# Patient Record
Sex: MALE | Race: WHITE | URBAN - METROPOLITAN AREA
[De-identification: names, ages, dates, MRNs, and addresses within clinical notes are randomized per-mention and may not be internally consistent; named-entity substitution may affect disease eponyms.]

---

## 2017-09-28 ENCOUNTER — EMERGENCY (EMERGENCY)
Facility: HOSPITAL | Age: 27
LOS: 1 days | End: 2017-09-28
Payer: MEDICAID

## 2017-09-28 PROCEDURE — 99283 EMERGENCY DEPT VISIT LOW MDM: CPT | Mod: 25

## 2017-09-28 PROCEDURE — 12002 RPR S/N/AX/GEN/TRNK2.6-7.5CM: CPT

## 2018-06-01 ENCOUNTER — OUTPATIENT (OUTPATIENT)
Dept: OUTPATIENT SERVICES | Facility: HOSPITAL | Age: 28
LOS: 1 days | End: 2018-06-01
Payer: MEDICAID

## 2018-06-01 PROCEDURE — G9001: CPT

## 2018-06-08 DIAGNOSIS — R69 ILLNESS, UNSPECIFIED: ICD-10-CM

## 2018-07-01 ENCOUNTER — OUTPATIENT (OUTPATIENT)
Dept: OUTPATIENT SERVICES | Facility: HOSPITAL | Age: 28
LOS: 1 days | End: 2018-07-01

## 2018-07-16 DIAGNOSIS — Z71.89 OTHER SPECIFIED COUNSELING: ICD-10-CM

## 2019-03-20 ENCOUNTER — OUTPATIENT (OUTPATIENT)
Dept: OUTPATIENT SERVICES | Facility: HOSPITAL | Age: 29
LOS: 1 days | End: 2019-03-20
Payer: MEDICAID

## 2019-03-20 DIAGNOSIS — M79.642 PAIN IN LEFT HAND: ICD-10-CM

## 2019-03-20 PROCEDURE — 73130 X-RAY EXAM OF HAND: CPT

## 2019-03-20 PROCEDURE — 73130 X-RAY EXAM OF HAND: CPT | Mod: 26,LT

## 2021-04-18 ENCOUNTER — TRANSCRIPTION ENCOUNTER (OUTPATIENT)
Age: 31
End: 2021-04-18

## 2022-02-08 ENCOUNTER — TRANSCRIPTION ENCOUNTER (OUTPATIENT)
Age: 32
End: 2022-02-08

## 2022-05-10 ENCOUNTER — NON-APPOINTMENT (OUTPATIENT)
Age: 32
End: 2022-05-10

## 2023-01-28 ENCOUNTER — APPOINTMENT (OUTPATIENT)
Dept: ORTHOPEDIC SURGERY | Facility: CLINIC | Age: 33
End: 2023-01-28
Payer: MEDICAID

## 2023-01-28 VITALS — HEIGHT: 70 IN | BODY MASS INDEX: 26.48 KG/M2 | WEIGHT: 185 LBS

## 2023-01-28 DIAGNOSIS — L03.113 CELLULITIS OF RIGHT UPPER LIMB: ICD-10-CM

## 2023-01-28 DIAGNOSIS — S60.221A CONTUSION OF RIGHT HAND, INITIAL ENCOUNTER: ICD-10-CM

## 2023-01-28 DIAGNOSIS — Z86.59 PERSONAL HISTORY OF OTHER MENTAL AND BEHAVIORAL DISORDERS: ICD-10-CM

## 2023-01-28 PROBLEM — Z00.00 ENCOUNTER FOR PREVENTIVE HEALTH EXAMINATION: Status: ACTIVE | Noted: 2023-01-28

## 2023-01-28 PROCEDURE — 99204 OFFICE O/P NEW MOD 45 MIN: CPT | Mod: 25

## 2023-01-28 PROCEDURE — 73130 X-RAY EXAM OF HAND: CPT | Mod: RT

## 2023-01-28 PROCEDURE — 29125 APPL SHORT ARM SPLINT STATIC: CPT

## 2023-01-28 RX ORDER — BUPRENORPHINE HCL/NALOXONE HCL 8 MG-2 MG
TABLET, SUBLINGUAL SUBLINGUAL
Refills: 0 | Status: ACTIVE | COMMUNITY

## 2023-01-28 RX ORDER — AMOXICILLIN AND CLAVULANATE POTASSIUM 500; 125 MG/1; MG/1
500-125 TABLET, FILM COATED ORAL
Qty: 14 | Refills: 0 | Status: COMPLETED | COMMUNITY
Start: 2023-01-28 | End: 2023-02-04

## 2023-01-28 RX ORDER — BUPROPION HYDROCHLORIDE 100 MG/1
TABLET, FILM COATED ORAL
Refills: 0 | Status: ACTIVE | COMMUNITY

## 2023-01-28 NOTE — PHYSICAL EXAM
[Right] : right hand [2nd] : 2nd [3rd] : 3rd [4th] : 4th [5th] : 5th [Metacarpal] : metacarpal [Finger Flexion] : finger flexion [] : no laceration/abrasion [FreeTextEntry3] : dorsal hand

## 2023-01-28 NOTE — DISCUSSION/SUMMARY
[de-identified] : The patient was advised of the diagnosis. The natural history of the pathology was explained in full to the patient in layman's terms. All questions were answered. The risks and benefits of surgical and non-surgical treatment alternatives were explained in full to the patient.\par \par A short arm splint is applied to the RIGHT upper extremity. Proper care instructions given.\par \par I discussed timing and frequency of the medication with the patient, including the importance of finishing antibiotic to completion.\par \par
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
## 2023-02-07 ENCOUNTER — APPOINTMENT (OUTPATIENT)
Dept: ORTHOPEDIC SURGERY | Facility: CLINIC | Age: 33
End: 2023-02-07

## 2023-02-27 ENCOUNTER — RX RENEWAL (OUTPATIENT)
Age: 33
End: 2023-02-27

## 2023-03-21 ENCOUNTER — RX RENEWAL (OUTPATIENT)
Age: 33
End: 2023-03-21

## 2023-03-21 RX ORDER — MELOXICAM 15 MG/1
15 TABLET ORAL DAILY
Qty: 21 | Refills: 0 | Status: ACTIVE | COMMUNITY
Start: 2023-01-28 | End: 1900-01-01

## 2023-08-09 ENCOUNTER — NON-APPOINTMENT (OUTPATIENT)
Age: 33
End: 2023-08-09

## 2024-04-29 ENCOUNTER — INPATIENT (INPATIENT)
Facility: HOSPITAL | Age: 34
LOS: 6 days | Discharge: HOME | DRG: 751 | End: 2024-05-06
Attending: PSYCHIATRY & NEUROLOGY | Admitting: PSYCHIATRY & NEUROLOGY
Payer: MEDICAID

## 2024-04-29 VITALS
DIASTOLIC BLOOD PRESSURE: 114 MMHG | OXYGEN SATURATION: 97 % | WEIGHT: 179.9 LBS | SYSTOLIC BLOOD PRESSURE: 137 MMHG | TEMPERATURE: 100 F | RESPIRATION RATE: 22 BRPM | HEIGHT: 69 IN | HEART RATE: 115 BPM

## 2024-04-29 DIAGNOSIS — F10.10 ALCOHOL ABUSE, UNCOMPLICATED: ICD-10-CM

## 2024-04-29 DIAGNOSIS — F29 UNSPECIFIED PSYCHOSIS NOT DUE TO A SUBSTANCE OR KNOWN PHYSIOLOGICAL CONDITION: ICD-10-CM

## 2024-04-29 DIAGNOSIS — F15.10 OTHER STIMULANT ABUSE, UNCOMPLICATED: ICD-10-CM

## 2024-04-29 DIAGNOSIS — F13.10 SEDATIVE, HYPNOTIC OR ANXIOLYTIC ABUSE, UNCOMPLICATED: ICD-10-CM

## 2024-04-29 LAB
ALBUMIN SERPL ELPH-MCNC: 4.7 G/DL — SIGNIFICANT CHANGE UP (ref 3.3–5)
ALP SERPL-CCNC: 126 U/L — HIGH (ref 40–120)
ALT FLD-CCNC: 41 U/L — SIGNIFICANT CHANGE UP (ref 12–78)
AMPHET UR-MCNC: POSITIVE — SIGNIFICANT CHANGE UP
ANION GAP SERPL CALC-SCNC: 14 MMOL/L — SIGNIFICANT CHANGE UP (ref 5–17)
APAP SERPL-MCNC: < 2 UG/ML (ref 10–30)
APPEARANCE UR: CLEAR — SIGNIFICANT CHANGE UP
AST SERPL-CCNC: 77 U/L — HIGH (ref 15–37)
BACTERIA # UR AUTO: NEGATIVE /HPF — SIGNIFICANT CHANGE UP
BARBITURATES UR SCN-MCNC: NEGATIVE — SIGNIFICANT CHANGE UP
BASOPHILS # BLD AUTO: 0.03 K/UL — SIGNIFICANT CHANGE UP (ref 0–0.2)
BASOPHILS NFR BLD AUTO: 0.3 % — SIGNIFICANT CHANGE UP (ref 0–2)
BENZODIAZ UR-MCNC: POSITIVE — SIGNIFICANT CHANGE UP
BILIRUB SERPL-MCNC: 1 MG/DL — SIGNIFICANT CHANGE UP (ref 0.2–1.2)
BILIRUB UR-MCNC: NEGATIVE — SIGNIFICANT CHANGE UP
BUN SERPL-MCNC: 51 MG/DL — HIGH (ref 7–23)
CALCIUM SERPL-MCNC: 9.6 MG/DL — SIGNIFICANT CHANGE UP (ref 8.5–10.1)
CAST: 11 /LPF — HIGH (ref 0–4)
CHLORIDE SERPL-SCNC: 110 MMOL/L — HIGH (ref 96–108)
CO2 SERPL-SCNC: 15 MMOL/L — LOW (ref 22–31)
COCAINE METAB.OTHER UR-MCNC: NEGATIVE — SIGNIFICANT CHANGE UP
COLOR SPEC: YELLOW — SIGNIFICANT CHANGE UP
CREAT SERPL-MCNC: 1.71 MG/DL — HIGH (ref 0.5–1.3)
DIFF PNL FLD: NEGATIVE — SIGNIFICANT CHANGE UP
EGFR: 53 ML/MIN/1.73M2 — LOW
EOSINOPHIL # BLD AUTO: 0 K/UL — SIGNIFICANT CHANGE UP (ref 0–0.5)
EOSINOPHIL NFR BLD AUTO: 0 % — SIGNIFICANT CHANGE UP (ref 0–6)
ETHANOL SERPL-MCNC: 49 MG/DL — HIGH (ref 0–10)
GLUCOSE SERPL-MCNC: 186 MG/DL — HIGH (ref 70–99)
GLUCOSE UR QL: NEGATIVE MG/DL — SIGNIFICANT CHANGE UP
HCT VFR BLD CALC: 42 % — SIGNIFICANT CHANGE UP (ref 39–50)
HGB BLD-MCNC: 13.5 G/DL — SIGNIFICANT CHANGE UP (ref 13–17)
HYALINE CASTS # UR AUTO: PRESENT
IMM GRANULOCYTES NFR BLD AUTO: 0.3 % — SIGNIFICANT CHANGE UP (ref 0–0.9)
KETONES UR-MCNC: 40 MG/DL
LEUKOCYTE ESTERASE UR-ACNC: NEGATIVE — SIGNIFICANT CHANGE UP
LYMPHOCYTES # BLD AUTO: 0.76 K/UL — LOW (ref 1–3.3)
LYMPHOCYTES # BLD AUTO: 8.8 % — LOW (ref 13–44)
MCHC RBC-ENTMCNC: 27.1 PG — SIGNIFICANT CHANGE UP (ref 27–34)
MCHC RBC-ENTMCNC: 32.1 GM/DL — SIGNIFICANT CHANGE UP (ref 32–36)
MCV RBC AUTO: 84.3 FL — SIGNIFICANT CHANGE UP (ref 80–100)
METHADONE UR-MCNC: NEGATIVE — SIGNIFICANT CHANGE UP
MONOCYTES # BLD AUTO: 0.55 K/UL — SIGNIFICANT CHANGE UP (ref 0–0.9)
MONOCYTES NFR BLD AUTO: 6.4 % — SIGNIFICANT CHANGE UP (ref 2–14)
NEUTROPHILS # BLD AUTO: 7.22 K/UL — SIGNIFICANT CHANGE UP (ref 1.8–7.4)
NEUTROPHILS NFR BLD AUTO: 84.2 % — HIGH (ref 43–77)
NITRITE UR-MCNC: NEGATIVE — SIGNIFICANT CHANGE UP
OPIATES UR-MCNC: NEGATIVE — SIGNIFICANT CHANGE UP
PCP SPEC-MCNC: SIGNIFICANT CHANGE UP
PCP UR-MCNC: NEGATIVE — SIGNIFICANT CHANGE UP
PH UR: 5.5 — SIGNIFICANT CHANGE UP (ref 5–8)
PLATELET # BLD AUTO: 343 K/UL — SIGNIFICANT CHANGE UP (ref 150–400)
POTASSIUM SERPL-MCNC: 4.4 MMOL/L — SIGNIFICANT CHANGE UP (ref 3.5–5.3)
POTASSIUM SERPL-SCNC: 4.4 MMOL/L — SIGNIFICANT CHANGE UP (ref 3.5–5.3)
PROT SERPL-MCNC: 8.3 GM/DL — SIGNIFICANT CHANGE UP (ref 6–8.3)
PROT UR-MCNC: 30 MG/DL
RBC # BLD: 4.98 M/UL — SIGNIFICANT CHANGE UP (ref 4.2–5.8)
RBC # FLD: 13.6 % — SIGNIFICANT CHANGE UP (ref 10.3–14.5)
RBC CASTS # UR COMP ASSIST: 0 /HPF — SIGNIFICANT CHANGE UP (ref 0–4)
SALICYLATES SERPL-MCNC: <1.7 MG/DL — LOW (ref 2.8–20)
SARS-COV-2 RNA SPEC QL NAA+PROBE: SIGNIFICANT CHANGE UP
SODIUM SERPL-SCNC: 139 MMOL/L — SIGNIFICANT CHANGE UP (ref 135–145)
SP GR SPEC: 1.03 — SIGNIFICANT CHANGE UP (ref 1–1.03)
SQUAMOUS # UR AUTO: 0 /HPF — SIGNIFICANT CHANGE UP (ref 0–5)
THC UR QL: NEGATIVE — SIGNIFICANT CHANGE UP
TSH SERPL-MCNC: 2.77 UU/ML — SIGNIFICANT CHANGE UP (ref 0.34–4.82)
UROBILINOGEN FLD QL: 0.2 MG/DL — SIGNIFICANT CHANGE UP (ref 0.2–1)
WBC # BLD: 8.59 K/UL — SIGNIFICANT CHANGE UP (ref 3.8–10.5)
WBC # FLD AUTO: 8.59 K/UL — SIGNIFICANT CHANGE UP (ref 3.8–10.5)
WBC UR QL: 1 /HPF — SIGNIFICANT CHANGE UP (ref 0–5)

## 2024-04-29 PROCEDURE — 86769 SARS-COV-2 COVID-19 ANTIBODY: CPT

## 2024-04-29 PROCEDURE — 83036 HEMOGLOBIN GLYCOSYLATED A1C: CPT

## 2024-04-29 PROCEDURE — 87635 SARS-COV-2 COVID-19 AMP PRB: CPT

## 2024-04-29 PROCEDURE — 87521 HEPATITIS C PROBE&RVRS TRNSC: CPT

## 2024-04-29 PROCEDURE — 80048 BASIC METABOLIC PNL TOTAL CA: CPT

## 2024-04-29 PROCEDURE — 99285 EMERGENCY DEPT VISIT HI MDM: CPT

## 2024-04-29 PROCEDURE — 84443 ASSAY THYROID STIM HORMONE: CPT

## 2024-04-29 PROCEDURE — 99221 1ST HOSP IP/OBS SF/LOW 40: CPT

## 2024-04-29 PROCEDURE — 70450 CT HEAD/BRAIN W/O DYE: CPT | Mod: 26,MC

## 2024-04-29 PROCEDURE — 80061 LIPID PANEL: CPT

## 2024-04-29 PROCEDURE — 86803 HEPATITIS C AB TEST: CPT

## 2024-04-29 PROCEDURE — 36415 COLL VENOUS BLD VENIPUNCTURE: CPT

## 2024-04-29 PROCEDURE — 93005 ELECTROCARDIOGRAM TRACING: CPT

## 2024-04-29 RX ORDER — SERTRALINE 25 MG/1
100 TABLET, FILM COATED ORAL DAILY
Refills: 0 | Status: DISCONTINUED | OUTPATIENT
Start: 2024-04-29 | End: 2024-04-30

## 2024-04-29 RX ORDER — HALOPERIDOL DECANOATE 100 MG/ML
5 INJECTION INTRAMUSCULAR ONCE
Refills: 0 | Status: COMPLETED | OUTPATIENT
Start: 2024-04-29 | End: 2024-04-29

## 2024-04-29 RX ORDER — DIPHENHYDRAMINE HCL 50 MG
50 CAPSULE ORAL ONCE
Refills: 0 | Status: COMPLETED | OUTPATIENT
Start: 2024-04-29 | End: 2024-04-29

## 2024-04-29 RX ORDER — DIPHENHYDRAMINE HCL 50 MG
50 CAPSULE ORAL EVERY 6 HOURS
Refills: 0 | Status: DISCONTINUED | OUTPATIENT
Start: 2024-04-29 | End: 2024-05-06

## 2024-04-29 RX ORDER — TRAZODONE HCL 50 MG
100 TABLET ORAL AT BEDTIME
Refills: 0 | Status: DISCONTINUED | OUTPATIENT
Start: 2024-04-29 | End: 2024-05-06

## 2024-04-29 RX ORDER — ARIPIPRAZOLE 15 MG/1
5 TABLET ORAL DAILY
Refills: 0 | Status: DISCONTINUED | OUTPATIENT
Start: 2024-04-29 | End: 2024-04-30

## 2024-04-29 RX ORDER — BUPRENORPHINE AND NALOXONE 2; .5 MG/1; MG/1
1 TABLET SUBLINGUAL DAILY
Refills: 0 | Status: DISCONTINUED | OUTPATIENT
Start: 2024-04-29 | End: 2024-05-06

## 2024-04-29 RX ORDER — HALOPERIDOL DECANOATE 100 MG/ML
5 INJECTION INTRAMUSCULAR EVERY 6 HOURS
Refills: 0 | Status: DISCONTINUED | OUTPATIENT
Start: 2024-04-29 | End: 2024-05-06

## 2024-04-29 RX ADMIN — ARIPIPRAZOLE 5 MILLIGRAM(S): 15 TABLET ORAL at 14:02

## 2024-04-29 RX ADMIN — HALOPERIDOL DECANOATE 5 MILLIGRAM(S): 100 INJECTION INTRAMUSCULAR at 06:59

## 2024-04-29 RX ADMIN — SERTRALINE 100 MILLIGRAM(S): 25 TABLET, FILM COATED ORAL at 14:02

## 2024-04-29 RX ADMIN — Medication 50 MILLIGRAM(S): at 06:59

## 2024-04-29 RX ADMIN — Medication 2 MILLIGRAM(S): at 06:59

## 2024-04-29 RX ADMIN — BUPRENORPHINE AND NALOXONE 1 FILM(S): 2; .5 TABLET SUBLINGUAL at 14:02

## 2024-04-29 NOTE — ED BEHAVIORAL HEALTH ASSESSMENT NOTE - DESCRIPTION
unemployed, resides with mother in [facility] denies Patient is sleepy and cooperative.

## 2024-04-29 NOTE — ED BEHAVIORAL HEALTH NOTE - BEHAVIORAL HEALTH NOTE
Search Terms: watson Platt, 1990Search Date: 04/29/2024 12:53:35 PM  The Drug Utilization Report below displays all of the controlled substance prescriptions, if any, that your patient has filled in the last twelve months. The information displayed on this report is compiled from pharmacy submissions to the Department, and accurately reflects the information as submitted by the pharmacies.    This report was requested by: Maribell Menard | Reference #: [id number]    Practitioner Count: 2  Pharmacy Count: 1  Current Opioid Prescriptions: 1  Current Benzodiazepine Prescriptions: 0  Current Stimulant Prescriptions: 0      Patient Demographic Information (PDI)       PDI	First Name	Last Name	Birth Date	Gender	Street Address	[facility] Code  A	Watson Platt	1990	Male	[address]	[place]	[address]    Prescription Information      PDI Filter:    PDI	My Rx	Current Rx	Drug Type	Rx Written	Rx Dispensed	Drug	Quantity	Days Supply	Prescriber Name	Prescriber ALEXIA #	Payment Method	Dispenser  A	N	Y	O	04/26/2024	04/26/2024	buprenorphine-naloxone 8-2 mg sl film	32	14	Rivas Rich	[id number]	Medicaid	[id number]  A	N	N	O	04/12/2024	04/12/2024	buprenorphine-naloxone 8-2 mg sl film	32	14	Shoshana Jonelle	[id number]	Medicaid	[id number]  A	N	N	O	03/04/2024	03/10/2024	buprenorphine-naloxone 8-2 mg sl film	7	3	Rivas Rich	[id number]	Medicaid	[id number]  A	N	N	O	01/30/2024	01/31/2024	buprenorphine-naloxone 8-2 mg sl film	68	30	Rivas Rich	[id number]	Medicaid	[id number]  A	N	N	O	01/03/2024	01/04/2024	buprenorphine-naloxone 8-2 mg sl film	68	30	Esme Godinez	[id number]	Medicaid	[id number]  A	N	N	O	12/18/2023	12/20/2023	buprenorphine-naloxone 8-2 mg sl film	34	15	Esme Godniez	[id number]	Medicaid	[facility][id number]  A	N	N	O	11/20/2023	11/20/2023	buprenorphine-naloxone 8-2 mg sl film	60	30	Esme Godinez	[id number]	Medicaid	[id number]  A	N	N	O	11/06/2023	11/08/2023	buprenorphine-naloxone 8-2 mg sl film	28	14	Shaq Richothy	[id number]	Medicaid	[id number]  A	N	N	O	10/24/2023	10/24/2023	buprenorphine-naloxone 8-2 mg sl film	28	14	Lev Carrion MD	[id number]	Medicaid	[id number]  A	N	N	O	09/25/2023	09/25/2023	buprenorphine-naloxone 8-2 mg sl film	28	14	Jonelle Anna	[id number]	Medicaid	[facility][id number]  A	N	N	O	09/18/2023	09/18/2023	buprenorphine-naloxone 8-2 mg sl film	14	7	Jonelle Anna	[id number]	Medicaid	[facility][id number]  A	N	N	O	09/11/2023	09/11/2023	buprenorphine-naloxone 8-2 mg sl film	16	8	Jonelle Anna	[id number]	Medicaid	[id number]  A	N	N	O	08/28/2023	08/29/2023	buprenorphine-naloxone 8-2 mg sl film	2	1	Kwabena Daugherty	[id number]	Medicaid	[facility]  A	N	N	O	08/08/2023	08/09/2023	buprenorphine-naloxone 8-2 mg sl film	28	14	Jonelle Anna	[id number]	Medicaid	[id number]  A	N	N	O	07/25/2023	07/26/2023	buprenorphine-naloxone 8-2 mg sl film	28	14	Jonelle Anna	[id number]	Medicaid	[id number]  A	N	N	O	07/11/2023	07/13/2023	buprenorphine-naloxone 8-2 mg sl film	14	7	Rivas Rich	[id number]	Medicaid	[facility][id number]  A	N	N	O	07/06/2023	07/06/2023	buprenorphine-naloxone 8-2 mg sl film	12	6	Adri Bauer	[id number]	Medicaid	[id number]  A	N	N	O	04/27/2023	05/01/2023	buprenorphine-naloxone 8-2 mg sl film	60	30	Jonelle Anna	[id number]	Medicaid	[id number]    * - Details of Drug Type : O = Opioid, B = Benzodiazepine, S = Stimulant    * - Drugs marked with an asterisk are compound drugs. If the compound drug is made up of more than one controlled substance, then each controlled substance will be a separate row in the t

## 2024-04-29 NOTE — PHARMACOTHERAPY INTERVENTION NOTE - COMMENTS
Med history complete, reviewed medications and allergies with patient and confirmed medication list with doctor first med profile, all medication related questions answered   Med history complete, patient unable to provide medication history, reviewed medications with doctor first med profile and [employer] database, all medication related questions answered

## 2024-04-29 NOTE — BH PATIENT PROFILE - SELF-CONCEPT, DO YOU LIKE YOURSELF, PROFILE
[General Appearance - Well Developed] : well developed [Normal Appearance] : normal appearance [Well Groomed] : well groomed [General Appearance - Well Nourished] : well nourished [No Deformities] : no deformities [General Appearance - In No Acute Distress] : no acute distress [Normal Conjunctiva] : the conjunctiva exhibited no abnormalities [Eyelids - No Xanthelasma] : the eyelids demonstrated no xanthelasmas [Normal Oral Mucosa] : normal oral mucosa [No Oral Pallor] : no oral pallor [No Oral Cyanosis] : no oral cyanosis [] : no respiratory distress [Auscultation Breath Sounds / Voice Sounds] : lungs were clear to auscultation bilaterally [Heart Sounds] : normal S1 and S2 [Arterial Pulses Normal] : the arterial pulses were normal [Edema] : no peripheral edema present [Irregularly Irregular] : the rhythm was irregularly irregular [Systolic grade ___/6] : A grade [unfilled]/6 systolic murmur was heard. [Nail Clubbing] : no clubbing of the fingernails [Cyanosis, Localized] : no localized cyanosis [Oriented To Time, Place, And Person] : oriented to person, place, and time [Bowel Sounds] : normal bowel sounds [Abdomen Soft] : soft [FreeTextEntry1] : No JVD no

## 2024-04-29 NOTE — ED ADULT NURSE NOTE - CHIEF COMPLAINT QUOTE
Pt bibems and [facility] #[id number]. As per EMS & PD " pt was wandering in and out of the streets, taking his clothes off and multiple ppl calling about the pt". Upon arrival, pt refusing to answer any questions, uncooperative, and scattered thoughts. Unknown drug or alcohol use. Security called to triage. MD Cordero at bedside for evaluation. 5mg of haldol, 2 of ativan and 50mg of benadryl ordered for psychosis. 1:1 initiated.

## 2024-04-29 NOTE — ED ADULT NURSE NOTE - OBJECTIVE STATEMENT
Pt bibems and [employer] #[id number]. Pt was found to be wandering in the middle of the road, 'taking clothes off' and PD 'received many calls' regarding pt. Pt uncooperative, refusing to answer questions, and saying things that do not make sense. Pt flailing in the stretcher- security called to triage. MD Cordero at bedside for evaluation. 5mg of haldol, 2 of ativan and 50mg of benadryl ordered for psychosis. 1:1 initiated. unknown drug or alcohol use.
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
## 2024-04-29 NOTE — BH PATIENT PROFILE - FLU SEASON?
Yes... Quality 130: Documentation Of Current Medications In The Medical Record: Current Medications Documented Detail Level: Detailed

## 2024-04-29 NOTE — BH SOCIAL WORK INITIAL PSYCHOSOCIAL EVALUATION - NSBHLIFEGOAL_PSY_ALL_CORE
Patient will be referred to the appropriate level of outpatient treatment and have appointments within 3-5 days of discharge.  Patient will be discharged to safe housing either back home or DSS emergency housing.  Benefits in place   will explore need for dual diagnosis tx with appointments if needed.      SW to continue to follow to plan for future d/c and assess needs.

## 2024-04-29 NOTE — H&P ADULT - NSICDXFAMHXNEG_GEN_ALL
atrial fibrillation/cancer/COPD/coronary disease/diabetes/heart disease/hypertension/kidney disease/stroke

## 2024-04-29 NOTE — ED PROVIDER NOTE - PHYSICAL EXAMINATION
Constitutional: intoxicated male disorganized.   Eyes: PERRLA  Head: Normocephalic   Mouth: MMM  Cardiac: regular rate   Resp: Lungs CTAB  GI: Abd s/nt/nd, no rebound or guarding.  Neuro: awake, alert, moving all extremities  Skin: No rashes   Psych: No SI/HI.

## 2024-04-29 NOTE — BH SOCIAL WORK INITIAL PSYCHOSOCIAL EVALUATION - NSBHLEGALCONVICT_PSY_ALL_CORE
15 yrs ago Pt incarcerated unknown details 15 yrs ago Pt incarcerated unknown details  Registered Sex Offender Level 3

## 2024-04-29 NOTE — H&P ADULT - NS ATTEND AMEND GEN_ALL_CORE FT
Case discussed and reviewed in detail after initial evaluation above by ARMIDA Arrington. Please note my plan below    33 y/o M w/ PMH of schizoaffetive vs schizophrenia vs substance induced psychotic disorder, polysubstance abuse,  p/w psychosis    *Psychosis  -Management as per psych    *Suspect LESLEY  -Encourage oral hydration  -Nephro consult   -Repeat BMP in AM     *Polysubstance abuse  -Monitor for signs of withdrawal     *Transaminitis / Elevated alk phos  -If stable -> F/u outpatient GI for further work up     *DVT ppx  -Encourage ambulation     50 mins required for consult Case discussed and reviewed in detail after initial evaluation above by ARMIDA Arrington. Please note my plan below    33 y/o M w/ PMH of schizoaffective vs schizophrenia vs substance induced psychotic disorder, polysubstance abuse,  p/w psychosis    *Psychosis  -Management as per psych    *Suspect LESLEY  -Encourage oral hydration  -Nephro consult   -Repeat BMP in AM     *Polysubstance abuse  -Monitor for signs of withdrawal     *Transaminitis / Elevated alk phos  -If stable -> F/u outpatient GI for further work up     *DVT ppx  -Encourage ambulation     55 mins required for consult
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
## 2024-04-29 NOTE — ED ADULT NURSE REASSESSMENT NOTE - NS ED NURSE REASSESS COMMENT FT1
report received from Radha SILVA. Pt calm and sleeping in stretcher, respirations even and unlabored. Pt arousable to voice. Blood work sent.  1:1 at bedside.

## 2024-04-29 NOTE — ED BEHAVIORAL HEALTH ASSESSMENT NOTE - GENERAL APPEARANCE
Continue amlodipine and metoprolol  Patient's chlorthalidone is on hold  Nephrologist started the patient on torsemide 10 mg once a day today  No deformities present

## 2024-04-29 NOTE — BH PATIENT PROFILE - PRIMARY ROLES/RESPONSIBILITIES
A rotating dilator sheath was inserted over the RV lead. The rotating dilator sheath was removed from lead. none

## 2024-04-29 NOTE — ED BEHAVIORAL HEALTH ASSESSMENT NOTE - RISK ASSESSMENT
Low acute risk: Patient does not have thoughts about harming self or anybody else.  He presents psychotic disorganized.    Increased long-term risk considering his underlying psychiatric condition and substance abuse.    Protective factors: Patient is engaged in treatment and he has supportive mother.    Mitigation of risk: Admission to inpatient psychiatry, medication and safety plan.

## 2024-04-29 NOTE — ED PROVIDER NOTE - CLINICAL SUMMARY MEDICAL DECISION MAKING FREE TEXT BOX
33 y/o male presents very agitated, not allowing us to care for him. Attempted verbal redirection. Will medicate, allow pt to sober up, CT brain, reassess.

## 2024-04-29 NOTE — H&P ADULT - SOCIAL HISTORY: SUBSTANCE USE
Denies use, but per history uses crystal meth.   Urine tox positive for amphetamines and benzodiazapine
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
## 2024-04-29 NOTE — H&P ADULT - NSHPPHYSICALEXAM_GEN_ALL_CORE
Vital Signs Last 24 Hrs  T(C): 36.3 (04-29-24 @ 18:05), Max: 37.8 (04-29-24 @ 06:54)  T(F): 97.4 (04-29-24 @ 18:05), Max: 100 (04-29-24 @ 06:54)  HR: 89 (04-29-24 @ 16:36) (89 - 116)  BP: 120/82 (04-29-24 @ 16:36) (120/82 - 143/73)  BP(mean): 96 (04-29-24 @ 16:36) (93 - 96)  RR: 10 (04-29-24 @ 16:36) (10 - 22)  SpO2: 100% (04-29-24 @ 16:36) (97% - 100%)    Orthostatic VS  04-29-24 @ 18:05  Lying BP: --/-- HR: --  Sitting BP: 124/78 HR: 99  Standing BP: 92/68 HR: 102  Site: --  Mode: --

## 2024-04-29 NOTE — ED PROVIDER NOTE - OBJECTIVE STATEMENT
35 y/o male presents [employer] agitated. PD states they received 3 different calls regarding pt. Pt was at [facility] then [facility] department and was acting out. PD reports when they arrived at these locations pt was gone. PD received another call, were able to apprehend him and brought pt to ED for evaluation.  Pt completely disorganized with one shoe on. JOVANY reviewed. Pt with hx of drug use including crystal meth and EtOH use. Pt admits to EtOH use last night.

## 2024-04-29 NOTE — BH SOCIAL WORK INITIAL PSYCHOSOCIAL EVALUATION - NSBHFINANCE_PSY_ALL_CORE
Mother provides financial support, Pt disabled/Social Security Disability (SSD) Mother provides financial support/No source of income

## 2024-04-29 NOTE — BH PATIENT PROFILE - HOME MEDICATIONS
traZODone 100 mg oral tablet , 1 tab(s) orally once a day (at bedtime) as needed for sleep  sertraline 100 mg oral tablet , 1 tab(s) orally once a day  buprenorphine-naloxone 8 mg-2 mg sublingual film , 2.25 film(s) sublingually once a day

## 2024-04-29 NOTE — H&P ADULT - ASSESSMENT
34 year old male with a past medical/psychiatric history of schizoaffective versus schizophrenia versus substance-induced psychotic disorders, polysubstance abuse including alcohol and crystal meth, with long history of psych illness/hospitalizations and is followed in [facility] program who presented to the hospital after being brought in by police for bizarre behavior and agitation. Patient was admitted to psychiatry for safety and stabilization. Medicine was consulted for medical evaluation.     Plan  1. Schizoaffective versus schizophrenia versus substance-induced psychotic disorders,  - management as per psychiatry    2. Polysubstance abuse   - Management per psychiatry     3. Elevated blood glucose level  - BMP in AM   - A1c in AM    4. LESLEY   - Encourage hydration  - Repeat BMP in AM   - Trend BUN/Creat  - If not improving consult Nephrology    5. DVT PPX  - Encourage Ambulation

## 2024-04-29 NOTE — ED ADULT TRIAGE NOTE - CHIEF COMPLAINT QUOTE
Pt bibems and [facility] #[id number]. As per EMS & PD " pt was wandering in and out of the streets, taking his clothes off and multiple ppl calling about the pt". Upon arrival, pt refusing to answer any questions, uncooperative, and scattered thoughts. Unknown drug or alcohol use. Security called to triage. MD Cordero at bedside for evaluation. 5mg of haldol, 2 of ativan and 50mg of benadryl ordered for psychosis. 1:1 initiated.
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
## 2024-04-29 NOTE — BH SOCIAL WORK INITIAL PSYCHOSOCIAL EVALUATION - NSBHSUBSTANCELAST_PSY_ALL_CORE
"Occupational Therapy   Initial Evaluation     Patient Name: Stephen Ortiz  Age:  83 y.o., Sex:  male  Medical Record #: [id number]  Today's Date: 12/29/2020     Precautions  Precautions: Fall Risk  Comments: new o2 needs    Assessment  Patient is 83 y.o. male with a diagnosis of acute respiratory failure as well as R great toe ulcer possibly osteomyelitis.  Additional factors influencing patient status / progress: weakness, fatigue, impaired balance.      Plan    Recommend Occupational Therapy 3 times per week until therapy goals are met for the following treatments:  Adaptive Equipment, Neuro Re-Education / Balance, Self Care/Activities of Daily Living, Therapeutic Activities and Therapeutic Exercises.    DC Equipment Recommendations: Unable to determine at this time  Discharge Recommendations: Recommend post-acute placement for additional occupational therapy services prior to discharge home     Subjective    \"I installed a grab bar myself in the hotel room\"     Objective       12/29/20 0950   Prior Living Situation   Prior Services Home-Independent   Housing / Facility   ([facility])   Bathroom Set up Bathtub / Shower Combination;Grab Bars   Equipment Owned 4-Wheel Walker   Lives with - Patient's Self Care Capacity Adult Children   Comments Pt just moved to the area, staying in a hotel right now until they find a place. Son is currently not working so can help if needed.   Prior Level of ADL Function   Self Feeding Independent   Grooming / Hygiene Independent   Bathing Independent   Dressing Independent   Toileting Independent   Prior Level of IADL Function   Medication Management Requires Assist   Laundry Requires Assist   Kitchen Mobility Requires Assist   Finances Requires Assist   Home Management Requires Assist   Shopping Requires Assist   Prior Level Of Mobility Independent With Device in Community;Independent With Device in Home   Driving / Transportation Relatives / Others Provide Transportation "   Occupation (Pre-Hospital Vocational) Retired Due To Age   Cognition    Cognition / Consciousness WDL   Level of Consciousness Alert   Comments very pleasant and cooperative, odd affect however. very tangential and delayed at times   Active ROM Upper Body   Active ROM Upper Body  X   Comments B shoulders limited due to arthritis   Strength Upper Body   Upper Body Strength  X   Gross Strength Generalized Weakness, Equal Bilaterally.    Bed Mobility    Supine to Sit Maximal Assist   Scooting Maximal Assist   ADL Assessment   Grooming Supervision;Seated  (oral care)   Lower Body Dressing Moderate Assist  (slippers)   Functional Mobility   Sit to Stand Minimal Assist   Bed, Chair, Wheelchair Transfer Minimal Assist   Mobility EOB>amb a few feet> chair   Comments w/ fww   Patient / Family Goals   Patient / Family Goal #1 to go home   Short Term Goals   Short Term Goal # 1 pt will demo toilet txf with supv   Short Term Goal # 2 pt will dress LB with supv and AE prn   Short Term Goal # 3 pt will groom in stance w/ supv                  Within the past 30 days

## 2024-04-29 NOTE — ED BEHAVIORAL HEALTH ASSESSMENT NOTE - COLLATERAL SOURCE
Personal collateral received signout from Trent Esparza - gilmar in obs for evaluation of chest pain(hx htn, dm, former smoker, last stress test >5 yrs ago); here ce/ekg x 2 unremarkable; covid neg; will plan for ccta in am

## 2024-04-29 NOTE — H&P ADULT - HISTORY OF PRESENT ILLNESS
This is a 34 year old male with a past medical/psychiatric history of schizoaffective versus schizophrenia versus substance-induced psychotic disorders, polysubstance abuse including alcohol and crystal meth, with long history of psych illness/hospitalizations and [facility] who presented to the hospital after being brought in by police for bizarre behavior and agitation. Patient was reportedly walking to traffic and after that he walked into the gym where he was upset, agitated and was disturbing public. Per patient, he drank too much alcohol, became drunk and was laying in the grass. Patient reportedly was recently detoxed from alcohol and crystal meth and discharged last week but then relapsed last night. Additionally per previous documentation, patient also has a history fo PTSD but was never in treatment. In ED, patient was found to be positive for alcohol, amphetamines and benzodiazapines.  Patient was admitted to psychiatry for safety and stabilization. Medicine was consulted for medical evaluation. During interview, patient is very sluggish, with slurred speech. Patient denies any pain, chest pain, SOB, abdominal pain, nausea, vomiting, headache, dizziness and all other acute complaints.

## 2024-04-30 LAB
A1C WITH ESTIMATED AVERAGE GLUCOSE RESULT: 5.7 % — HIGH (ref 4–5.6)
ANION GAP SERPL CALC-SCNC: 2 MMOL/L — LOW (ref 5–17)
BUN SERPL-MCNC: 24 MG/DL — HIGH (ref 7–23)
CALCIUM SERPL-MCNC: 9.6 MG/DL — SIGNIFICANT CHANGE UP (ref 8.5–10.1)
CHLORIDE SERPL-SCNC: 107 MMOL/L — SIGNIFICANT CHANGE UP (ref 96–108)
CHOLEST SERPL-MCNC: 228 MG/DL — HIGH
CO2 SERPL-SCNC: 30 MMOL/L — SIGNIFICANT CHANGE UP (ref 22–31)
COVID-19 SPIKE DOMAIN AB INTERP: POSITIVE
COVID-19 SPIKE DOMAIN ANTIBODY RESULT: >250 U/ML — HIGH
CREAT SERPL-MCNC: 0.91 MG/DL — SIGNIFICANT CHANGE UP (ref 0.5–1.3)
EGFR: 113 ML/MIN/1.73M2 — SIGNIFICANT CHANGE UP
ESTIMATED AVERAGE GLUCOSE: 117 MG/DL — HIGH (ref 68–114)
GLUCOSE SERPL-MCNC: 90 MG/DL — SIGNIFICANT CHANGE UP (ref 70–99)
HCV AB S/CO SERPL IA: 7.61 S/CO — HIGH (ref 0–0.99)
HCV AB SERPL-IMP: REACTIVE
HCV RNA FLD QL NAA+PROBE: SIGNIFICANT CHANGE UP
HCV RNA SPEC QL PROBE+SIG AMP: SIGNIFICANT CHANGE UP
HDLC SERPL-MCNC: 70 MG/DL — SIGNIFICANT CHANGE UP
LIPID PNL WITH DIRECT LDL SERPL: 149 MG/DL — HIGH
NON HDL CHOLESTEROL: 158 MG/DL — HIGH
POTASSIUM SERPL-MCNC: 4.4 MMOL/L — SIGNIFICANT CHANGE UP (ref 3.5–5.3)
POTASSIUM SERPL-SCNC: 4.4 MMOL/L — SIGNIFICANT CHANGE UP (ref 3.5–5.3)
SARS-COV-2 IGG+IGM SERPL QL IA: >250 U/ML — HIGH
SARS-COV-2 IGG+IGM SERPL QL IA: POSITIVE
SODIUM SERPL-SCNC: 139 MMOL/L — SIGNIFICANT CHANGE UP (ref 135–145)
TRIGL SERPL-MCNC: 52 MG/DL — SIGNIFICANT CHANGE UP

## 2024-04-30 PROCEDURE — 99221 1ST HOSP IP/OBS SF/LOW 40: CPT

## 2024-04-30 PROCEDURE — 93010 ELECTROCARDIOGRAM REPORT: CPT

## 2024-04-30 RX ORDER — SERTRALINE 25 MG/1
25 TABLET, FILM COATED ORAL DAILY
Refills: 0 | Status: DISCONTINUED | OUTPATIENT
Start: 2024-05-01 | End: 2024-05-01

## 2024-04-30 RX ORDER — PALIPERIDONE 1.5 MG/1
3 TABLET, EXTENDED RELEASE ORAL AT BEDTIME
Refills: 0 | Status: DISCONTINUED | OUTPATIENT
Start: 2024-04-30 | End: 2024-05-01

## 2024-04-30 RX ADMIN — SERTRALINE 100 MILLIGRAM(S): 25 TABLET, FILM COATED ORAL at 09:08

## 2024-04-30 RX ADMIN — ARIPIPRAZOLE 5 MILLIGRAM(S): 15 TABLET ORAL at 09:07

## 2024-04-30 RX ADMIN — BUPRENORPHINE AND NALOXONE 1 FILM(S): 2; .5 TABLET SUBLINGUAL at 09:08

## 2024-04-30 NOTE — BH INPATIENT PSYCHIATRY ASSESSMENT NOTE - NSBHMETABOLIC_PSY_ALL_CORE_FT
BMI: BMI (kg/m2): 29.5 (04-29-24 @ 18:05)  HbA1c: A1C with Estimated Average Glucose Result: 5.7 % (04-29-24 @ 14:18)    Glucose:   BP: 120/82 (04-29-24 @ 16:36) (120/82 - 143/73)Vital Signs Last 24 Hrs  T(C): 36.3 (04-30-24 @ 06:52), Max: 36.3 (04-29-24 @ 18:05)  T(F): 97.4 (04-30-24 @ 06:52), Max: 97.4 (04-29-24 @ 18:05)  HR: --  BP: --  BP(mean): --  RR: 18 (04-30-24 @ 06:52) (18 - 18)  SpO2: 98% (04-30-24 @ 06:52) (98% - 98%)    Orthostatic VS  04-30-24 @ 06:52  Lying BP: --/-- HR: --  Sitting BP: 109/72 HR: 76  Standing BP: 107/58 HR: 87  Site: upper right arm  Mode: electronic  Orthostatic VS  04-29-24 @ 18:05  Lying BP: --/-- HR: --  Sitting BP: 124/78 HR: 99  Standing BP: 92/68 HR: 102  Site: --  Mode: --    Lipid Panel: Date/Time: 04-30-24 @ 08:37  Cholesterol, Serum: 228  LDL Cholesterol Calculated: 149  HDL Cholesterol, Serum: 70  Total Cholesterol/HDL Ration Measurement: --  Triglycerides, Serum: 52

## 2024-04-30 NOTE — BH SAFETY PLAN - WARNING SIGN 1
Triggers: Recent breakup.  Relapse on substances.  Triggers: Recent breakup. Boredom.   Relapse on substances.

## 2024-04-30 NOTE — BH TREATMENT PLAN - NSTXDISORGINTERRN_PSY_ALL_CORE
The patient is encouraged to adhere to his medication regimen, and report any new or worsening side effects.

## 2024-04-30 NOTE — BH TREATMENT PLAN - NSTXPLANTHERAPYSESSIONSFT_PSY_ALL_CORE
05-02-24  Type of therapy: Creative arts therapy, Chair yoga  Type of session: Group  Level of patient participation: Participates  Duration of participation: 60 minutes  Therapy conducted by: Psych rehab  Therapy Summary: Patient attended group programming today. Socialized with peers.

## 2024-04-30 NOTE — BH SAFETY PLAN - WARNING SIGN 3
Waking up in the morning and constantly not feeling good.  Waking up in the morning and not feeling good.

## 2024-04-30 NOTE — BH TREATMENT PLAN - NSTXDCOTHRINTERSW_PSY_ALL_CORE
Psych Lucila met with Pt in dayroom to assess further needs and to provide d/c updates. Pt aware of d/c today and reports mother will provide transportation home upon d/c.     No further needs at this time.    [facility] phone number - [phone]/ Fax. [phone],  Dr. Quevedo.  LUCILA faxed hospital medical clinical to Dr. Quevedo upon pt discharge.    Lucila faxed d/c clinical to [facility] (PH. [phone]/FAX. [phone]).

## 2024-04-30 NOTE — BH INPATIENT PSYCHIATRY ASSESSMENT NOTE - CURRENT MEDICATION
MEDICATIONS  (STANDING):  ARIPiprazole 5 milliGRAM(s) Oral daily  buprenorphine 8 mG/naloxone 2 mG SL Film 1 Film(s) SubLingual daily  sertraline 100 milliGRAM(s) Oral daily    MEDICATIONS  (PRN):  diphenhydrAMINE Injectable 50 milliGRAM(s) IntraMuscular every 6 hours PRN EPS  haloperidol    Injectable 5 milliGRAM(s) IntraMuscular every 6 hours PRN severe psychotioc agitation  LORazepam   Injectable 2 milliGRAM(s) IntraMuscular every 4 hours PRN severe agitation  traZODone 100 milliGRAM(s) Oral at bedtime PRN sleep

## 2024-04-30 NOTE — BH SAFETY PLAN - CRISIS CLINICIAN NAME 1
My therapist Matthew at [facility]  My therapist Matthew at [facility] ,Matthew My therapist Matthew at [facility]

## 2024-04-30 NOTE — BH INPATIENT PSYCHIATRY ASSESSMENT NOTE - NSTXDCOTHRGOAL_PSY_ALL_CORE
Patient will be referred to the appropriate level of outpatient treatment and have appointments within 3-5 days of discharge.  Patient will be discharged to safe housing either back home or DSS emergency housing.  Benefits in place   will explore need for dual diagnosis tx with appointments if needed.
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
## 2024-04-30 NOTE — BH TREATMENT PLAN - NSTXDCOTHRGOAL_PSY_ALL_CORE
[facility] to resume therapy Tuesday, 5/7/24 at 4:30PM with his therapist, Matthew. Also arranged for him to resume his 3 time weekly group sessions starting Monday, 5/6/24, Tuesday, 5/7/24 and Thursday, 5/9/24. All sessions start at 5:30 PM. They are located at [address] [place] [address] (PH. [phone]/FAX. [phone]).    [facility] and provided the phone number - [phone]/ Fax. [phone], Called [facility] and scheduled an appointment for medical follow up on Thursday, 5/9/24 at 11:30AM with Dr. Quevedo.

## 2024-04-30 NOTE — BH INPATIENT PSYCHIATRY ASSESSMENT NOTE - NSBHASSESSSUMMFT_PSY_ALL_CORE
4/30/2024 Pt with illogical  thoughts , denies any hallucination  too appears preoccupied .  Pt denies any suicidal ideation intent or plan Pt with no delusion elicited . He is claiming to be feeling tired and that he had not been sleeping well for the past week .  Pt denies feeling depressed bu is more anxious.
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
## 2024-04-30 NOTE — BH INPATIENT PSYCHIATRY ASSESSMENT NOTE - NSBHCHARTREVIEWVS_PSY_A_CORE FT
Vital Signs Last 24 Hrs  T(C): 36.3 (04-30-24 @ 06:52), Max: 36.3 (04-29-24 @ 18:05)  T(F): 97.4 (04-30-24 @ 06:52), Max: 97.4 (04-29-24 @ 18:05)  HR: --  BP: --  BP(mean): --  RR: 18 (04-30-24 @ 06:52) (18 - 18)  SpO2: 98% (04-30-24 @ 06:52) (98% - 98%)    Orthostatic VS  04-30-24 @ 06:52  Lying BP: --/-- HR: --  Sitting BP: 109/72 HR: 76  Standing BP: 107/58 HR: 87  Site: upper right arm  Mode: electronic  Orthostatic VS  04-29-24 @ 18:05  Lying BP: --/-- HR: --  Sitting BP: 124/78 HR: 99  Standing BP: 92/68 HR: 102  Site: --  Mode: --

## 2024-04-30 NOTE — BH SAFETY PLAN - ENVIRONMENT SAFETY 1:
I want to go back to [facility]. I go to groups twice a week and see my therapist Matthew for individual sessions.

## 2024-05-01 PROCEDURE — 99231 SBSQ HOSP IP/OBS SF/LOW 25: CPT

## 2024-05-01 RX ORDER — BENZTROPINE MESYLATE 1 MG
1 TABLET ORAL
Refills: 0 | Status: DISCONTINUED | OUTPATIENT
Start: 2024-05-01 | End: 2024-05-06

## 2024-05-01 RX ORDER — PALIPERIDONE 1.5 MG/1
6 TABLET, EXTENDED RELEASE ORAL AT BEDTIME
Refills: 0 | Status: DISCONTINUED | OUTPATIENT
Start: 2024-05-01 | End: 2024-05-06

## 2024-05-01 RX ADMIN — SERTRALINE 25 MILLIGRAM(S): 25 TABLET, FILM COATED ORAL at 09:36

## 2024-05-01 RX ADMIN — BUPRENORPHINE AND NALOXONE 1 FILM(S): 2; .5 TABLET SUBLINGUAL at 09:36

## 2024-05-02 PROCEDURE — 99232 SBSQ HOSP IP/OBS MODERATE 35: CPT

## 2024-05-02 RX ADMIN — BUPRENORPHINE AND NALOXONE 1 FILM(S): 2; .5 TABLET SUBLINGUAL at 08:56

## 2024-05-02 RX ADMIN — PALIPERIDONE 6 MILLIGRAM(S): 1.5 TABLET, EXTENDED RELEASE ORAL at 20:59

## 2024-05-03 PROCEDURE — 99233 SBSQ HOSP IP/OBS HIGH 50: CPT

## 2024-05-03 RX ADMIN — PALIPERIDONE 6 MILLIGRAM(S): 1.5 TABLET, EXTENDED RELEASE ORAL at 20:17

## 2024-05-03 RX ADMIN — BUPRENORPHINE AND NALOXONE 1 FILM(S): 2; .5 TABLET SUBLINGUAL at 08:46

## 2024-05-03 NOTE — BH INPATIENT PSYCHIATRY PROGRESS NOTE - NSBHFUPINTERVALHXFT_PSY_A_CORE
Pt minimizing symptoms and is isolative . Pt still with preoccupied but can be redirected.  Will increase invega  to 6mg  daily . Pt verbalized willing to attend aftercare  
Pt with decreased isolation and is more interactive , with increased goal directed speech.   spoke with his mother who indicated "he's doing well"   protective pt with family and family support   chronic legal issues, psych hx , substance use
Pt with decreased isolation and is with denial of any hallucinations. Pt with decreased preoccupation  and now with improved goal directed speech.  Pt is aware of his Hep C status and is aware of need for follow up with GI .  Pt added that he was "aware for awhile" but did not continue with follow up.  Pt currently on invega which is mostly metabolized through renal rather than liver.    Pt with improved eye contact and is less irritable .  Placed call to his mother with pt giving consent to contact her. Spoke with the pt about pssible converting invega to injectable form but pt declines the need for injectable medication

## 2024-05-03 NOTE — BH INPATIENT PSYCHIATRY PROGRESS NOTE - NSTXDISORGGOAL_PSY_ALL_CORE
Will make at least 3 goal and reality oriented statements during therapy
Will make at least 3 goal and reality oriented statements during therapy
impaired balance/impaired postural control/decreased strength/decreased ROM
Will make at least 3 goal and reality oriented statements during therapy

## 2024-05-03 NOTE — BH DISCHARGE NOTE NURSING/SOCIAL WORK/PSYCH REHAB - NSDCPLANREVIEWED_PSY_ALL_CORE
Discharge note was reviewed with the patient. He is agreeable with receiving his MD/Nursing/SW discharge note through the patient portal.  Handoff has been provided to his outpatient provide [facility]./Yes

## 2024-05-03 NOTE — BH INPATIENT PSYCHIATRY PROGRESS NOTE - PRN MEDS
MEDICATIONS  (PRN):  benztropine 1 milliGRAM(s) Oral two times a day PRN Extrapyramidal symptoms  diphenhydrAMINE Injectable 50 milliGRAM(s) IntraMuscular every 6 hours PRN EPS  haloperidol    Injectable 5 milliGRAM(s) IntraMuscular every 6 hours PRN severe psychotioc agitation  LORazepam   Injectable 2 milliGRAM(s) IntraMuscular every 4 hours PRN severe agitation  traZODone 100 milliGRAM(s) Oral at bedtime PRN sleep  

## 2024-05-03 NOTE — BH DISCHARGE NOTE NURSING/SOCIAL WORK/PSYCH REHAB - NSDCPRGOAL_PSY_ALL_CORE
Patient worked on coping skill development, safety planning and relapse prevention through participation in unit programming.

## 2024-05-03 NOTE — BH INPATIENT PSYCHIATRY PROGRESS NOTE - CURRENT MEDICATION
MEDICATIONS  (STANDING):  buprenorphine 8 mG/naloxone 2 mG SL Film 1 Film(s) SubLingual daily  paliperidone ER 6 milliGRAM(s) Oral at bedtime    MEDICATIONS  (PRN):  benztropine 1 milliGRAM(s) Oral two times a day PRN Extrapyramidal symptoms  diphenhydrAMINE Injectable 50 milliGRAM(s) IntraMuscular every 6 hours PRN EPS  haloperidol    Injectable 5 milliGRAM(s) IntraMuscular every 6 hours PRN severe psychotioc agitation  LORazepam   Injectable 2 milliGRAM(s) IntraMuscular every 4 hours PRN severe agitation  traZODone 100 milliGRAM(s) Oral at bedtime PRN sleep  

## 2024-05-03 NOTE — BH INPATIENT PSYCHIATRY PROGRESS NOTE - NSBHASSESSSUMMFT_PSY_ALL_CORE
pt denies any suicide ideation inten or plan 
low risk for suicide . Pt denies any suicidal ideation intent or plan   mitigating pt with medication   protective pt with family and support , place to live   chronic pt with prior psych hx   
pt denies any suicidal ideation intent or plan   mitigating pt with medication   protective pt with  chronic pt with prior psy hx

## 2024-05-03 NOTE — BH INPATIENT PSYCHIATRY PROGRESS NOTE - NSBHCHARTREVIEWVS_PSY_A_CORE FT
Vital Signs Last 24 Hrs  T(C): 36.6 (05-02-24 @ 07:32), Max: 36.6 (05-02-24 @ 07:32)  T(F): 97.9 (05-02-24 @ 07:32), Max: 97.9 (05-02-24 @ 07:32)  HR: --  BP: --  BP(mean): --  RR: 18 (05-02-24 @ 07:32) (18 - 18)  SpO2: 99% (05-02-24 @ 07:32) (99% - 99%)    Orthostatic VS  05-02-24 @ 07:32  Lying BP: --/-- HR: --  Sitting BP: 113/53 HR: 73  Standing BP: 103/65 HR: 80  Site: upper right arm  Mode: electronic  Orthostatic VS  05-01-24 @ 08:10  Lying BP: --/-- HR: --  Sitting BP: 108/65 HR: 71  Standing BP: 102/54 HR: 76  Site: upper right arm  Mode: electronic  
Vital Signs Last 24 Hrs  T(C): 36.5 (05-03-24 @ 07:23), Max: 36.5 (05-03-24 @ 07:23)  T(F): 97.7 (05-03-24 @ 07:23), Max: 97.7 (05-03-24 @ 07:23)  HR: --  BP: --  BP(mean): --  RR: 16 (05-03-24 @ 07:23) (16 - 16)  SpO2: 98% (05-03-24 @ 07:23) (98% - 98%)    Orthostatic VS  05-03-24 @ 07:23  Lying BP: --/-- HR: --  Sitting BP: 123/70 HR: 78  Standing BP: 118/61 HR: 93  Site: upper right arm  Mode: electronic  Orthostatic VS  05-02-24 @ 07:32  Lying BP: --/-- HR: --  Sitting BP: 113/53 HR: 73  Standing BP: 103/65 HR: 80  Site: upper right arm  Mode: electronic  
Vital Signs Last 24 Hrs  T(C): 36.6 (05-01-24 @ 08:10), Max: 36.6 (05-01-24 @ 08:10)  T(F): 97.9 (05-01-24 @ 08:10), Max: 97.9 (05-01-24 @ 08:10)  HR: --  BP: --  BP(mean): --  RR: 18 (05-01-24 @ 08:10) (18 - 18)  SpO2: 99% (05-01-24 @ 08:10) (99% - 99%)    Orthostatic VS  05-01-24 @ 08:10  Lying BP: --/-- HR: --  Sitting BP: 108/65 HR: 71  Standing BP: 102/54 HR: 76  Site: upper right arm  Mode: electronic  Orthostatic VS  04-30-24 @ 06:52  Lying BP: --/-- HR: --  Sitting BP: 109/72 HR: 76  Standing BP: 107/58 HR: 87  Site: upper right arm  Mode: electronic

## 2024-05-03 NOTE — BH DISCHARGE NOTE NURSING/SOCIAL WORK/PSYCH REHAB - PATIENT PORTAL LINK FT
You can access the FollowMyHealth Patient Portal offered by [facility] by registering at the following website: [email or web address]. By joining [facility]’s FollowMyHealth portal, you will also be able to view your health information using other applications (apps) compatible with our system.

## 2024-05-03 NOTE — BH DISCHARGE NOTE NURSING/SOCIAL WORK/PSYCH REHAB - NSDCADDINFO1FT_PSY_ALL_CORE
Patient has an IN-PERSON appointment for individual therapy on Tuesday, 5/7/2024 at 4:30 PM with therapist, Matthew at [facility].    Patient is to resume his group treatment via ZOOM on Monday, 5/6/24; Tuesday, 5/7/24 and Thursday, 5/9/24 all sessions at 5:30PM at [facility].

## 2024-05-03 NOTE — BH INPATIENT PSYCHIATRY PROGRESS NOTE - NSBHTIMEACTIVITIESPERFORMED_PSY_A_CORE
1. interviewed the pt to assess current mental status  2. reviewed medications    3. reviewed lab results   4. conferred with nursing concerning behavior on the unit    5. spoke with his mother who is the main caregiver

## 2024-05-03 NOTE — BH INPATIENT PSYCHIATRY PROGRESS NOTE - NSICDXBHSECONDARYDX_PSY_ALL_CORE
Methamphetamine abuse   F15.10  Alcohol abuse   F10.10  Benzodiazepine abuse   F13.10  

## 2024-05-03 NOTE — BH INPATIENT PSYCHIATRY PROGRESS NOTE - NSBHMSESPABN_PSY_A_CORE
Walking
Pressured rate/Impaired articulation

## 2024-05-03 NOTE — BH DISCHARGE NOTE NURSING/SOCIAL WORK/PSYCH REHAB - NSBHDCPHONE1FT_PSY_A_CORE
[phone] Protocol For Protocol For Photochemotherapy For Severe Photoresponsive Dermatoses: Bath Puva: The patient received Photochemotherapy for severe photoresponsive dermatoses: Bath PUVA requiring at least 4 to 8 hours of care under direct physician supervision.

## 2024-05-03 NOTE — BH DISCHARGE NOTE NURSING/SOCIAL WORK/PSYCH REHAB - NSDCADDINFO2FT_PSY_ALL_CORE
Patient has an appointment for medical follow up with his primary care physician, Dr. Quevedo at [facility] on Thursday, 5/9/24 at 11:30AM

## 2024-05-03 NOTE — BH INPATIENT PSYCHIATRY PROGRESS NOTE - NSDCCRITERIA_PSY_ALL_CORE
pt with stable mood and affect , denies any suicidal ideation intent or plan 

## 2024-05-03 NOTE — BH INPATIENT PSYCHIATRY PROGRESS NOTE - NSBHMETABOLIC_PSY_ALL_CORE_FT
BMI: BMI (kg/m2): 29.5 (04-29-24 @ 18:05)  HbA1c: A1C with Estimated Average Glucose Result: 5.7 % (04-29-24 @ 14:18)    Glucose:   BP: --Vital Signs Last 24 Hrs  T(C): 36.5 (05-03-24 @ 07:23), Max: 36.5 (05-03-24 @ 07:23)  T(F): 97.7 (05-03-24 @ 07:23), Max: 97.7 (05-03-24 @ 07:23)  HR: --  BP: --  BP(mean): --  RR: 16 (05-03-24 @ 07:23) (16 - 16)  SpO2: 98% (05-03-24 @ 07:23) (98% - 98%)    Orthostatic VS  05-03-24 @ 07:23  Lying BP: --/-- HR: --  Sitting BP: 123/70 HR: 78  Standing BP: 118/61 HR: 93  Site: upper right arm  Mode: electronic  Orthostatic VS  05-02-24 @ 07:32  Lying BP: --/-- HR: --  Sitting BP: 113/53 HR: 73  Standing BP: 103/65 HR: 80  Site: upper right arm  Mode: electronic    Lipid Panel: Date/Time: 04-30-24 @ 08:37  Cholesterol, Serum: 228  LDL Cholesterol Calculated: 149  HDL Cholesterol, Serum: 70  Total Cholesterol/HDL Ration Measurement: --  Triglycerides, Serum: 52  
BMI: BMI (kg/m2): 29.5 (04-29-24 @ 18:05)  HbA1c: A1C with Estimated Average Glucose Result: 5.7 % (04-29-24 @ 14:18)    Glucose:   BP: 120/82 (04-29-24 @ 16:36) (120/82 - 143/73)Vital Signs Last 24 Hrs  T(C): 36.6 (05-01-24 @ 08:10), Max: 36.6 (05-01-24 @ 08:10)  T(F): 97.9 (05-01-24 @ 08:10), Max: 97.9 (05-01-24 @ 08:10)  HR: --  BP: --  BP(mean): --  RR: 18 (05-01-24 @ 08:10) (18 - 18)  SpO2: 99% (05-01-24 @ 08:10) (99% - 99%)    Orthostatic VS  05-01-24 @ 08:10  Lying BP: --/-- HR: --  Sitting BP: 108/65 HR: 71  Standing BP: 102/54 HR: 76  Site: upper right arm  Mode: electronic  Orthostatic VS  04-30-24 @ 06:52  Lying BP: --/-- HR: --  Sitting BP: 109/72 HR: 76  Standing BP: 107/58 HR: 87  Site: upper right arm  Mode: electronic    Lipid Panel: Date/Time: 04-30-24 @ 08:37  Cholesterol, Serum: 228  LDL Cholesterol Calculated: 149  HDL Cholesterol, Serum: 70  Total Cholesterol/HDL Ration Measurement: --  Triglycerides, Serum: 52  
BMI: BMI (kg/m2): 29.5 (04-29-24 @ 18:05)  HbA1c: A1C with Estimated Average Glucose Result: 5.7 % (04-29-24 @ 14:18)    Glucose:   BP: --Vital Signs Last 24 Hrs  T(C): 36.6 (05-02-24 @ 07:32), Max: 36.6 (05-02-24 @ 07:32)  T(F): 97.9 (05-02-24 @ 07:32), Max: 97.9 (05-02-24 @ 07:32)  HR: --  BP: --  BP(mean): --  RR: 18 (05-02-24 @ 07:32) (18 - 18)  SpO2: 99% (05-02-24 @ 07:32) (99% - 99%)    Orthostatic VS  05-02-24 @ 07:32  Lying BP: --/-- HR: --  Sitting BP: 113/53 HR: 73  Standing BP: 103/65 HR: 80  Site: upper right arm  Mode: electronic  Orthostatic VS  05-01-24 @ 08:10  Lying BP: --/-- HR: --  Sitting BP: 108/65 HR: 71  Standing BP: 102/54 HR: 76  Site: upper right arm  Mode: electronic    Lipid Panel: Date/Time: 04-30-24 @ 08:37  Cholesterol, Serum: 228  LDL Cholesterol Calculated: 149  HDL Cholesterol, Serum: 70  Total Cholesterol/HDL Ration Measurement: --  Triglycerides, Serum: 52

## 2024-05-03 NOTE — BH INPATIENT PSYCHIATRY PROGRESS NOTE - NSBHMSETHTPROC_PSY_A_CORE
Disorganized/Illogical/Impaired reasoning

## 2024-05-03 NOTE — BH DISCHARGE NOTE NURSING/SOCIAL WORK/PSYCH REHAB - NSBHCRISISRESOURCESOTHER_PSY_ALL_CORE_FT
[facility] [phone] 24/7  Carolina Wong, Nurse Manager; Dr. Martínez, Psychiatrist; Danika Crisostomo,   Please feel free to call and speak to any clinical staff about your mental health concerns. Ask to speak to Dr. Martínez with any questions about your medications, labwork or treatment on 5N. Please return to the ED if your symptoms return or worsen.

## 2024-05-03 NOTE — BH DISCHARGE NOTE NURSING/SOCIAL WORK/PSYCH REHAB - NSCDUDCCRISIS_PSY_A_CORE
.  [facility] for Children, Adults and Families  [address] [place] [address]  Mobile Crisis Hotline – (237) 985-3523/.National Suicide Prevention Lifeline 3 (087) 356-0259/.  [facility]  1 (410) LIFENET (109-7851)/.  [facility]  [phone] 188-5380/.  [facility] Response Crisis Hotline  (365) 104-4830  24 hour telephone crisis intervention and suicide prevention hotline concerned with all mental health issues/Other.../.  [facility]  [phone] [place] [address]   [phone]   Hours: Monday through Friday from 10 AM to 4 PM/597 Suicide and Crisis Lifeline none

## 2024-05-04 RX ADMIN — PALIPERIDONE 6 MILLIGRAM(S): 1.5 TABLET, EXTENDED RELEASE ORAL at 20:47

## 2024-05-04 RX ADMIN — BUPRENORPHINE AND NALOXONE 1 FILM(S): 2; .5 TABLET SUBLINGUAL at 08:59

## 2024-05-05 RX ADMIN — PALIPERIDONE 6 MILLIGRAM(S): 1.5 TABLET, EXTENDED RELEASE ORAL at 20:38

## 2024-05-05 RX ADMIN — Medication 100 MILLIGRAM(S): at 21:14

## 2024-05-05 RX ADMIN — BUPRENORPHINE AND NALOXONE 1 FILM(S): 2; .5 TABLET SUBLINGUAL at 08:36

## 2024-05-06 VITALS — RESPIRATION RATE: 16 BRPM | TEMPERATURE: 98 F | OXYGEN SATURATION: 98 %

## 2024-05-06 PROCEDURE — 99239 HOSP IP/OBS DSCHRG MGMT >30: CPT

## 2024-05-06 RX ORDER — SERTRALINE 25 MG/1
1 TABLET, FILM COATED ORAL
Qty: 15 | Refills: 1
Start: 2024-05-06 | End: 2024-06-04

## 2024-05-06 RX ORDER — SERTRALINE 25 MG/1
1 TABLET, FILM COATED ORAL
Refills: 0 | DISCHARGE

## 2024-05-06 RX ORDER — PALIPERIDONE 1.5 MG/1
1 TABLET, EXTENDED RELEASE ORAL
Qty: 15 | Refills: 1
Start: 2024-05-06 | End: 2024-06-04

## 2024-05-06 RX ORDER — BUPRENORPHINE AND NALOXONE 2; .5 MG/1; MG/1
1 TABLET SUBLINGUAL
Qty: 15 | Refills: 0
Start: 2024-05-06 | End: 2024-10-03

## 2024-05-06 RX ORDER — BUPRENORPHINE AND NALOXONE 2; .5 MG/1; MG/1
1 TABLET SUBLINGUAL DAILY
Refills: 0 | Status: DISCONTINUED | OUTPATIENT
Start: 2024-05-06 | End: 2024-05-06

## 2024-05-06 RX ORDER — BENZTROPINE MESYLATE 1 MG
1 TABLET ORAL
Qty: 30 | Refills: 1
Start: 2024-05-06 | End: 2024-06-04

## 2024-05-06 RX ORDER — TRAZODONE HCL 50 MG
1 TABLET ORAL
Qty: 15 | Refills: 1
Start: 2024-05-06 | End: 2024-06-04

## 2024-05-06 RX ORDER — TRAZODONE HCL 50 MG
1 TABLET ORAL
Refills: 0 | DISCHARGE

## 2024-05-06 RX ORDER — BUPRENORPHINE AND NALOXONE 2; .5 MG/1; MG/1
2.25 TABLET SUBLINGUAL
Refills: 0 | DISCHARGE

## 2024-05-06 RX ADMIN — BUPRENORPHINE AND NALOXONE 1 FILM(S): 2; .5 TABLET SUBLINGUAL at 09:07

## 2024-05-06 NOTE — BH INPATIENT PSYCHIATRY DISCHARGE NOTE - HOSPITAL COURSE
Pt with initial auditory hallucination of voices, paranoid and grandiose delusions. Pt with good response to the following medication :  MEDICATIONS  (STANDING):  paliperidone ER 6 milliGRAM(s) Oral at bedtime    MEDICATIONS  (PRN):  benztropine 1 milliGRAM(s) Oral two times a day PRN Extrapyramidal symptoms  traZODone 100 milliGRAM(s) Oral at bedtime PRN sleep    Pt was educated about the above medication and pt declined the need for conversion of the medication to injectable form. Pt's mother who is the mainsupporter for the pt is aware of pt choice to forego invega sustenna. Pt and mother are and advised pt to follow up with GI specialist for treatment of Hep C.  Pt verbalized that he was aware of the Hep C before admission to the hospital.

## 2024-05-06 NOTE — BH INPATIENT PSYCHIATRY DISCHARGE NOTE - NSBHMETABOLIC_PSY_ALL_CORE_FT
BMI: BMI (kg/m2): 29.5 (04-29-24 @ 18:05)  HbA1c: A1C with Estimated Average Glucose Result: 5.7 % (04-29-24 @ 14:18)    Glucose:   BP: --Vital Signs Last 24 Hrs  T(C): 36.6 (05-06-24 @ 08:14), Max: 36.6 (05-06-24 @ 08:14)  T(F): 97.8 (05-06-24 @ 08:14), Max: 97.8 (05-06-24 @ 08:14)  HR: --  BP: --  BP(mean): --  RR: 16 (05-06-24 @ 08:14) (16 - 16)  SpO2: 98% (05-06-24 @ 08:14) (98% - 98%)    Orthostatic VS  05-06-24 @ 08:14  Lying BP: --/-- HR: --  Sitting BP: 111/70 HR: 56  Standing BP: 104/64 HR: 80  Site: upper right arm  Mode: electronic  Orthostatic VS  05-05-24 @ 07:25  Lying BP: --/-- HR: --  Sitting BP: 108/81 HR: 55  Standing BP: 100/75 HR: 72  Site: upper right arm  Mode: electronic    Lipid Panel: Date/Time: 04-30-24 @ 08:37  Cholesterol, Serum: 228  LDL Cholesterol Calculated: 149  HDL Cholesterol, Serum: 70  Total Cholesterol/HDL Ration Measurement: --  Triglycerides, Serum: 52

## 2024-05-06 NOTE — BH INPATIENT PSYCHIATRY DISCHARGE NOTE - NSDCMRMEDTOKEN_GEN_ALL_CORE_FT
buprenorphine-naloxone 8 mg-2 mg sublingual film: 2.25 film(s) sublingually once a day  sertraline 100 mg oral tablet: 1 tab(s) orally once a day  traZODone 100 mg oral tablet: 1 tab(s) orally once a day (at bedtime) as needed for sleep   benztropine 1 mg oral tablet: 1 tab(s) orally 2 times a day as needed for Extrapyramidal symptoms  buprenorphine-naloxone 8 mg-2 mg sublingual film: 1 film(s) sublingual once a day MDD: 8mg  paliperidone 6 mg oral tablet, extended release: 1 tab(s) orally once a day (at bedtime)  sertraline 100 mg oral tablet: 1 tab(s) orally once a day  traZODone 100 mg oral tablet: 1 tab(s) orally once a day (at bedtime) as needed for sleep

## 2024-05-06 NOTE — BH INPATIENT PSYCHIATRY DISCHARGE NOTE - REASON FOR ADMISSION
34 years old white single male schizoaffective polysubstance abuse including alcohol, crystal meth urine tox is positive for  [place], followed in [facility] program, He takes Zoloft and trazodone and he is on Suboxone. recently detoxed from alcohol and crystal meth and discharged last week.Patient relapsed.He was walking to traffic and after that he walked into the gym where he was upset, agitated and was disturbing public.very disorganized, confused, sluggish, slurred speech and fast speech. incarcerated 15 yrs ag oHistory of Legal Conviction: 15 yrs ago Pt incarcerated unknown details  Registered Sex Offender Level 3
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
## 2024-05-06 NOTE — BH INPATIENT PSYCHIATRY DISCHARGE NOTE - NSBHFUPINTERVALHXFT_PSY_A_CORE
Pt with good eye contact Alert. Pt denies  and hallucinations . Pt denies any suicidal ideation intent or plan . Pt with euthymic mood and affect is full and mood congruent. Pt denies any side effect from mediation .  Pt verbalized willing to attend aftercare treatment and continue with medication , and to refrain from the use of substances .

## 2024-05-06 NOTE — BH INPATIENT PSYCHIATRY DISCHARGE NOTE - NSBHASSESSSUMMFT_PSY_ALL_CORE
low risk as the pt denies any suicidal ideation intent or plan Pt with verbalizations of willing to continue with aftercare treatment   mitigating pt with medication   protective pt with family and place to live   chronic pt with priot psych hx

## 2024-05-09 NOTE — CHART NOTE - NSCHARTNOTEFT_GEN_A_CORE
34 year old male with a past medical/psychiatric history of schizoaffective versus schizophrenia versus substance-induced psychotic disorders, polysubstance abuse including alcohol and crystal meth, with long history of psych illness/hospitalizations and is followed in [facility] who presented to the hospital after being brought in by police for bizarre behavior and agitation.    LESLEY resolved  Glucose Stable, A1c 5.7  Hep C antibody +, await RNA reflex  if positive FU outpatient GI for treatment  Will sign off
Attempted outreach to follow up with patient after discharge. No answer, voicemail box was full.
Attempted outreach to patient. Unable to reach for follow up call.
Unable to reach patient for follow up call. Voicemail box is full.

## 2024-05-10 DIAGNOSIS — Z56.0 UNEMPLOYMENT, UNSPECIFIED: ICD-10-CM

## 2024-05-10 DIAGNOSIS — F19.959 OTHER PSYCHOACTIVE SUBSTANCE USE, UNSPECIFIED WITH PSYCHOACTIVE SUBSTANCE-INDUCED PSYCHOTIC DISORDER, UNSPECIFIED: ICD-10-CM

## 2024-05-10 DIAGNOSIS — F10.10 ALCOHOL ABUSE, UNCOMPLICATED: ICD-10-CM

## 2024-05-10 DIAGNOSIS — N17.9 ACUTE KIDNEY FAILURE, UNSPECIFIED: ICD-10-CM

## 2024-05-10 DIAGNOSIS — F20.89 OTHER SCHIZOPHRENIA: ICD-10-CM
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
## 2024-05-10 DIAGNOSIS — Z11.52 ENCOUNTER FOR SCREENING FOR COVID-19: ICD-10-CM

## 2024-05-10 SDOH — ECONOMIC STABILITY - INCOME SECURITY: UNEMPLOYMENT, UNSPECIFIED: Z56.0
